# Patient Record
Sex: MALE | Race: WHITE
[De-identification: names, ages, dates, MRNs, and addresses within clinical notes are randomized per-mention and may not be internally consistent; named-entity substitution may affect disease eponyms.]

---
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
## 2023-02-09 ENCOUNTER — HOSPITAL ENCOUNTER (EMERGENCY)
Dept: HOSPITAL 46 - ED | Age: 38
Discharge: HOME | End: 2023-02-09
Payer: COMMERCIAL

## 2023-02-09 VITALS — HEIGHT: 69 IN | WEIGHT: 215.57 LBS | BODY MASS INDEX: 31.93 KG/M2

## 2023-02-09 DIAGNOSIS — Z88.5: ICD-10-CM

## 2023-02-09 DIAGNOSIS — F17.200: ICD-10-CM

## 2023-02-09 DIAGNOSIS — K02.9: Primary | ICD-10-CM

## 2023-02-09 DIAGNOSIS — Z79.899: ICD-10-CM

## 2023-02-09 DIAGNOSIS — Z88.8: ICD-10-CM

## 2023-02-09 DIAGNOSIS — Z91.030: ICD-10-CM

## 2023-02-09 DIAGNOSIS — Z88.1: ICD-10-CM

## 2023-02-09 PROCEDURE — A9270 NON-COVERED ITEM OR SERVICE: HCPCS

## 2024-10-21 ENCOUNTER — HOSPITAL ENCOUNTER (EMERGENCY)
Dept: HOSPITAL 46 - ED | Age: 39
Discharge: HOME | End: 2024-10-21
Payer: COMMERCIAL

## 2024-10-21 VITALS — HEIGHT: 69 IN | WEIGHT: 227.3 LBS | BODY MASS INDEX: 33.67 KG/M2

## 2024-10-21 VITALS — SYSTOLIC BLOOD PRESSURE: 119 MMHG | DIASTOLIC BLOOD PRESSURE: 76 MMHG

## 2024-10-21 DIAGNOSIS — Z91.030: ICD-10-CM

## 2024-10-21 DIAGNOSIS — Z91.018: ICD-10-CM

## 2024-10-21 DIAGNOSIS — L02.414: Primary | ICD-10-CM

## 2024-10-21 DIAGNOSIS — F17.200: ICD-10-CM

## 2024-10-21 DIAGNOSIS — Z88.8: ICD-10-CM

## 2024-10-21 DIAGNOSIS — Z88.5: ICD-10-CM
